# Patient Record
(demographics unavailable — no encounter records)

---

## 2025-02-21 NOTE — PHYSICAL EXAM
[TextEntry] : General: awake, alert, no acute distress, playful, appropriate for age Head: normocephalic, no signs injury Eyes: + red reflex bilaterally, EOMI, no purulent discharge, no conjunctival or scleral erythema Ears: tympanic membranes normal appearing on the left, right TM with erythema and purulent effusion  Nose: no discharge, nares appear patent bilaterally Mouth: mucosa moist and pink, no cleft lip, palate appears intact, oropharynx without erythema Neck: supple, FROM Lungs: clear to auscultation bilaterally, no accessory muscle use Cardiac: normal S1 S2, regular rate and rhythm, no murmur appreciated Abdomen: soft, non tender, non distended, no hepatosplenomegaly  Genitals: normal appearing male genitalia, testicles descended bilaterally Lymphatics: no abnormal lymph nodes palpated Neuro: no focal deficits, tone appropriate  Back: no scoliosis noted on seated exam Skin: no jaundice, no rash

## 2025-02-21 NOTE — HISTORY OF PRESENT ILLNESS
[Mother] : mother [Yes] : Patient goes to dentist yearly [Vitamin] : Primary Fluoride Source: Vitamin [No] : Not at  exposure [Water heater temperature set at <120 degrees F] : Water heater temperature set at <120 degrees F [Car seat in back seat] : Car seat in back seat [Smoke Detectors] : Smoke detectors [Supervised play near cars and streets] : Supervised play near cars and streets [Carbon Monoxide Detectors] : Carbon monoxide detectors [Exposure to electronic nicotine delivery system] : No exposure to electronic nicotine delivery system [FreeTextEntry7] : 3 y/o [facility] [NO] : No [FreeTextEntry1] : Lives with parents No history of injury  and  patient is doing well - has no concerns or issues. Appetite good, consumes fruits, vegetables, meat, dairy No sleep concerns,  brushing teeth 1-2 x a day (tries 2 x a day), dentist visit every 6 months recommended Patient not having any fevers without a cause, pain that wakes them in the night, or night sweats. Able to keep up with peers during exercise. Urinating and stooling normally. No lead exposure concern.  mom cocnered about speech, has good language but doesnt answer questions well having him f/u with [facility]  hasn't had hearing checked  some mild cough/congestion last few days no fever not feeling sick

## 2025-02-21 NOTE — HISTORY OF PRESENT ILLNESS
[Mother] : mother [Yes] : Patient goes to dentist yearly [Vitamin] : Primary Fluoride Source: Vitamin [No] : Not at  exposure [Water heater temperature set at <120 degrees F] : Water heater temperature set at <120 degrees F [Car seat in back seat] : Car seat in back seat [Smoke Detectors] : Smoke detectors [Supervised play near cars and streets] : Supervised play near cars and streets [Carbon Monoxide Detectors] : Carbon monoxide detectors [Exposure to electronic nicotine delivery system] : No exposure to electronic nicotine delivery system [FreeTextEntry7] : 3 y/o [facility] [NO] : No [FreeTextEntry1] : Lives with parents No history of injury  and  patient is doing well - has no concerns or issues. Appetite good, consumes fruits, vegetables, meat, dairy No sleep concerns,  brushing teeth 1-2 x a day (tries 2 x a day), dentist visit every 6 months recommended Patient not having any fevers without a cause, pain that wakes them in the night, or night sweats. Able to keep up with peers during exercise. Urinating and stooling normally. No lead exposure concern.  mom cocnered about speech, has good language but doesnt answer questions well having him f/u with [facility]  hasn't had hearing checked  some mild cough/congestion last few days no fever not feeling sick   No

## 2025-02-21 NOTE — PLAN
[TextEntry] : Continue balanced diet with all food groups. Brush teeth twice a day with toothbrush. Recommend visit to dentist. As per car seat 's guidelines, use foward-facing car seat in back seat of car. Switch to booster seat when child reaches highest weight/height for seat. Put toddler to sleep in own bed. Help toddler to maintain consistent daily routines and sleep schedule. Ensure home is safe. Use consistent, positive discipline. Read aloud to toddler. Limit screen time to no more than 2 hours per day. Return for well child check at 3yo or sooner if concerns.  lead questionnaire reviewed, care coordination reviewed, 5-2-1-0 reviewed. oral health risk assessment tool reviewed, dental referral recommended constipation: discussed avoidance of stool hardening foods such as banana, apples, dairy, carb heavy foods, rice increasing stool softening foods such as plums, peaches, pears, watermelon, berries, vegetables increase water in diet  miralax 1/4capful in 6-8oz liquid once daily, titrate to soft stools. otitis media: Symptomatic treatment of fever and/or pain discussed Start medication as instructed Hydrate well Handwashing and infection control discussed Return to office if febrile > 48 hours or if symptoms get worse Go to ER if unable to come to the office or during after hours, parent encouraged to call service first before doing so. Follow up 2 weeks

## 2025-02-21 NOTE — DEVELOPMENTAL MILESTONES
[Yes: _______] : yes, [unfilled] [FreeTextEntry1] : GM: 3y-4 FMA: 3y-7 PS: < 3 L: 3y-6 (comprehension an issue)

## 2025-03-07 NOTE — HISTORY OF PRESENT ILLNESS
[de-identified] : recheck ears [FreeTextEntry6] : doing well finished course antibiotics No fever, no cough, No ear pain, + chronic nasal congestion doesnt seem to ever really go away  No wheezing Normal appetite, No vomiting, No diarrhea no complaints

## 2025-03-07 NOTE — PHYSICAL EXAM
[TextEntry] : General: awake, alert, cooperative, appropriate, no acute distress Head: no signs injury Eyes: EOMI, PERRL, no discharge, no conjunctival or scleral erythema  Ears: tympanic membranes clear bilaterally without erythema or purulent effusion, normal light reflex, retracted on the right Nose: no rhinnorhea + edematous nasal turbinates B/L  Mouth: mucosa moist and pink, no erythema to the oropharynx, no vesicles, exudates, lesions or soft palate petechiae Neck: supple, good range of motion Lungs: clear to auscultation bilaterally  Cardiac: normal S1 S2, regular rate and rhythm Abdomen: soft, non tender, non distended Lymphatics: no cervical lymphadenopathy, no pre or post auricular lymphadenopathy, no occipital lymphadenopathy Skin: no rash

## 2025-03-07 NOTE — PLAN
[TextEntry] : Symptomatic treatment Maintain adequate hydration  Avoid environments that trigger allergies Use OTC oral and/or opthalmic antihistamines (ex. Claritin, Zaditor )  Use nasal steroids if needed as discussed Instructed to use above medications EVERYDAY during allergy season Stressed handwashing and infection control  Pay close observation for new or worsening symptoms Instructed to return to office if condition worsens or new symptoms arise

## 2025-03-31 NOTE — PHYSICAL EXAM
[General Appearance - Well Developed] : interactive [General Appearance - Well-Appearing] : well appearing [General Appearance - In No Acute Distress] : in no acute distress [Sclera] : the conjunctiva were normal [Outer Ear] : the ears and nose were normal in appearance [Tympanic Membrane Erythematous Right] : was red [Right Tympanic Membrane Bulging] : was bulging [Tympanic Membrane Loss Of Light Reflex Right Ear] : had a diminished light reflex [Middle Ear Fluid Right] : a ~M middle ear effusion was present [Tympanic Membrane Erythematous Left] : was red [Middle Ear Fluid Left] : a ~M middle ear effusion was present [Normal Appearance] : was normal in appearance [Neck Supple] : was supple [Enlarged Diffusely] : was not enlarged [Respiration, Rhythm And Depth] : normal respiratory rhythm and effort [Auscultation Breath Sounds / Voice Sounds] : clear bilateral breath sounds [Heart Rate And Rhythm] : heart rate and rhythm were normal [Heart Sounds] : normal S1 and S2 [Murmurs] : no murmurs [Bowel Sounds] : normal bowel sounds [Abdomen Soft] : soft [Abdomen Tenderness] : non-tender [Abdominal Distention] : nondistended [] : no hepato-splenomegaly [Musculoskeletal Exam: Normal Movement Of All Extremities] : normal movements of all extremities [No Visual Abnormalities] : no visible abnormailities [Motor Tone] : normal muscle strength and tone [Generalized Lymph Node Enlargement] : no lymphadenopathy [Abnormal Color] : normal color and pigmentation [Skin Lesions 1] : no skin lesions were observed [Skin Turgor Decreased] : normal skin turgor [Normal] : normal texture and mobility [Initial Inspection: Infant Active And Alert] : active and alert

## 2025-03-31 NOTE — PLAN
[TextEntry] : Symptomatic treatment of fever and/or pain discussed Start medication as instructed Hydrate well Handwashing and infection control discussed Return to office if febrile > 48 hours or if symptoms get worse Go to ER if unable to come to the office or during after hours, parent encouraged to call service first before doing so. Follow up 2 weeks not cleared for procedure

## 2025-03-31 NOTE — HISTORY OF PRESENT ILLNESS
[Preoperative Visit] : for a medical evaluation prior to surgery [Good] : Good [Prior Anesthesia] : Prior anesthesia [Fever] : no fever [Chills] : no chills [Runny Nose] : runny nose  [Earache] : no earache [Headache] : no headache [Sore Throat] : no sore throat [Cough] : cough [Appetite] : no decrease in appetite [Nausea] : no nausea [Vomiting] : no vomiting [Abdominal Pain] : no abdominal pain [Diarrhea] : no diarrhea [Easy Bruising] : no easy bruising [Rash] : no rash [Dysuria] : no dysuria [Urinary Frequency] : no urinary frequency [Prev Anesthesia Reaction] : no previous anesthesia reaction [Diabetes] : no diabetes [Pulmonary Disease] : no pulmonary disease [Renal Disease] : no renal disease [GI Disease] : no gastrointestinal disease [Sleep Apnea] : no sleep apnea [Transfusion Reaction] : no transfusion reaction [Impaired Immunity] : no impaired immunity [Frequent use of NSAIDs] : no use of NSAIDs [Anesthesia Reaction] : no anesthesia reaction [Clotting Disorder] : no clotting disorder [Bleeding Disorder] : no bleeding disorder [Sudden Death] : no sudden death [FreeTextEntry2] : 04/03/2025 [de-identified] : DR. Barboza [FreeTextEntry1] : No fever, intermittent cough, No ear pain, has mild chronic nasal congestion No wheezing Normal appetite, No vomiting, No diarrhea

## 2025-04-14 NOTE — PHYSICAL EXAM
[General Appearance - Well Developed] : interactive [General Appearance - Well-Appearing] : well appearing [General Appearance - In No Acute Distress] : in no acute distress [Sclera] : the conjunctiva were normal [Outer Ear] : the ears and nose were normal in appearance [Examination Of The Oral Cavity] : mucous membranes were moist and pink [Normal Appearance] : was normal in appearance [Neck Supple] : was supple [Enlarged Diffusely] : was not enlarged [Respiration, Rhythm And Depth] : normal respiratory rhythm and effort [Auscultation Breath Sounds / Voice Sounds] : clear bilateral breath sounds [Heart Rate And Rhythm] : heart rate and rhythm were normal [Heart Sounds] : normal S1 and S2 [Murmurs] : no murmurs [Musculoskeletal Exam: Normal Movement Of All Extremities] : normal movements of all extremities [Motor Tone] : muscle strength and tone were normal [Abnormal Color] : normal color and pigmentation [Skin Lesions 1] : no skin lesions were observed [Skin Turgor Decreased] : normal skin turgor [Normal] : normal texture and mobility [FreeTextEntry1] : right hydrocele

## 2025-04-14 NOTE — HISTORY OF PRESENT ILLNESS
[Preoperative Visit] : for a medical evaluation prior to surgery [Good] : Good [Prior Anesthesia] : Prior anesthesia [de-identified] : Follow up for ear re-check. As per mom, pt completed antibiotics. No c/o pain.  [Fever] : no fever [Chills] : no chills [Runny Nose] : no runny nose [Earache] : no earache [Headache] : no headache [Sore Throat] : no sore throat [Cough] : no cough [Appetite] : no decrease in appetite [Nausea] : no nausea [Vomiting] : no vomiting [Abdominal Pain] : no abdominal pain [Diarrhea] : no diarrhea [Easy Bruising] : no easy bruising [Rash] : no rash [Dysuria] : no dysuria [Urinary Frequency] : no urinary frequency [Prev Anesthesia Reaction] : no previous anesthesia reaction [Diabetes] : no diabetes [Pulmonary Disease] : no pulmonary disease [Renal Disease] : no renal disease [GI Disease] : no gastrointestinal disease [Sleep Apnea] : no sleep apnea [Transfusion Reaction] : no transfusion reaction [Impaired Immunity] : no impaired immunity [Frequent use of NSAIDs] : no use of NSAIDs [Anesthesia Reaction] : no anesthesia reaction [Clotting Disorder] : no clotting disorder [Bleeding Disorder] : no bleeding disorder [Sudden Death] : no sudden death [FreeTextEntry2] : 4/17/25 [de-identified] : Dr Barboza [FreeTextEntry1] : No fever, No cough, No ear pain, mild chronic nasal congestion No wheezing Normal appetite, No vomiting, No diarrhea

## 2025-04-14 NOTE — HISTORY OF PRESENT ILLNESS
[Preoperative Visit] : for a medical evaluation prior to surgery [Good] : Good [Prior Anesthesia] : Prior anesthesia [de-identified] : Follow up for ear re-check. As per mom, pt completed antibiotics. No c/o pain.  [Fever] : no fever [Chills] : no chills [Runny Nose] : no runny nose [Earache] : no earache [Headache] : no headache [Sore Throat] : no sore throat [Cough] : no cough [Appetite] : no decrease in appetite [Nausea] : no nausea [Vomiting] : no vomiting [Abdominal Pain] : no abdominal pain [Diarrhea] : no diarrhea [Easy Bruising] : no easy bruising [Rash] : no rash [Dysuria] : no dysuria [Urinary Frequency] : no urinary frequency [Prev Anesthesia Reaction] : no previous anesthesia reaction [Diabetes] : no diabetes [Pulmonary Disease] : no pulmonary disease [Renal Disease] : no renal disease [GI Disease] : no gastrointestinal disease [Sleep Apnea] : no sleep apnea [Transfusion Reaction] : no transfusion reaction [Impaired Immunity] : no impaired immunity [Frequent use of NSAIDs] : no use of NSAIDs [Anesthesia Reaction] : no anesthesia reaction [Clotting Disorder] : no clotting disorder [Bleeding Disorder] : no bleeding disorder [Sudden Death] : no sudden death [FreeTextEntry2] : 4/17/25 [de-identified] : Dr Barboza [FreeTextEntry1] : No fever, No cough, No ear pain, mild chronic nasal congestion No wheezing Normal appetite, No vomiting, No diarrhea

## 2025-07-09 NOTE — HISTORY OF PRESENT ILLNESS
[de-identified] : Randy is a 3yr old boy here for initial evaluation of speech delay, failed hearing screen, nasal congestion and frequent ear infections.  He is accompanied by his mom who provided relevant history.   History of speech delay in receptive language and articulation.  Started speech therapy in March and did it for 2 months, he is currently not in speech but plans to restart in September.  Mom has no concerns with hearing. Passed [facility].  He had 2 ear infections in January back to back, no additional ear infections. Failed hearing screen that was performed in Feb after his ear infections, passed on re-test after abx.  +Chronic nasal congestion and mouth breathing.  Takes a long time to clear viral infections.  Has never trialed nasal steroids.  Occasional snoring only when sick.  No pausing in breathing, choking, or gasping while asleep.   No family history of bleeding disorder or complications from anesthesia.  Not followed by any other specialists. Possible asthma, Albuterol PRN per PMD.

## 2025-07-09 NOTE — PROCEDURE
[FreeTextEntry1] : Nasal Endoscopy [FreeTextEntry2] : Chronic rhinitis [FreeTextEntry3] : After informed verbal consent, nasal endoscopy was performed due to anterior rhinoscopy insufficient to account for symptoms.   The nasal endoscope is passed via the nasal cavity. Flexible endoscope was used. Septum was midline. The inferior, middle, and superior turbinates and inferior, middle and superior meati were examined and with edematous appearing mucosa. The osteomeatal complex is clear with no polyposis or purulence. The sphenoethmoidal recess is clear with no polyposis or purulence. No masses or lesions noted. The choana is patent. The opposite nasal cavity and nasopharynx was examined with similar findings.   There is 75% obstruction of the nasopharynx with adenoid tissue. No other masses.   Patient tolerated the procedure well.

## 2025-07-09 NOTE — ASSESSMENT
[FreeTextEntry1] : I personally reviewed and interpreted the audiogram. I explained the results of the audiogram to the family. I am pleased that hearing is within normal limits today.  We reviewed nasal endoscopy findings - edematous mucosa and moderate adenoid hypertrophy.  I recommend the use of Flonase one spray per nostril once per day. We reviewed the proper administration techniques and a prescription was provided.  Follow up 2-3 months.

## 2025-07-09 NOTE — REASON FOR VISIT
[Initial Consultation] : an initial consultation for [Mother] : mother [FreeTextEntry2] : failed hearing screen, snoring, nasal congestion.

## 2025-07-09 NOTE — CONSULT LETTER
[Courtesy Letter:] : I had the pleasure of seeing your patient, [unfilled], in my office today. [Please see my note below.] : Please see my note below. [Sincerely,] : Sincerely, [FreeTextEntry2] : Dr. Jack  [FreeTextEntry3] : Maile Packer M.D. [place] [address] Tel [phone] Fax [phone]

## 2025-07-09 NOTE — DATA REVIEWED
[FreeTextEntry1] : Audiogram 7/9/25, personally reviewed:  Hearing within normal limits 250-4000 Hz bilaterally, type A tympanograms bilaterally, TEOAE pass bilaterally, SRT 15dB bilaterally

## 2025-07-09 NOTE — PHYSICAL EXAM
[Normal Gait and Station] : normal gait and station [Normal muscle strength, symmetry and tone of facial, head and neck musculature] : normal muscle strength, symmetry and tone of facial, head and neck musculature [Normal] : no cervical lymphadenopathy [2+] : 2+ [Exposed Vessel] : left anterior vessel not exposed [Increased Work of Breathing] : no increased work of breathing with use of accessory muscles and retractions